# Patient Record
Sex: MALE | Race: BLACK OR AFRICAN AMERICAN | Employment: UNEMPLOYED | URBAN - METROPOLITAN AREA
[De-identification: names, ages, dates, MRNs, and addresses within clinical notes are randomized per-mention and may not be internally consistent; named-entity substitution may affect disease eponyms.]

---

## 2018-10-16 ENCOUNTER — HOSPITAL ENCOUNTER (EMERGENCY)
Age: 43
Discharge: HOME | End: 2018-10-16
Attending: EMERGENCY MEDICINE
Payer: SELF-PAY

## 2018-10-16 ENCOUNTER — APPOINTMENT (OUTPATIENT)
Dept: GENERAL RADIOLOGY | Age: 43
End: 2018-10-16
Attending: PHYSICIAN ASSISTANT
Payer: SELF-PAY

## 2018-10-16 VITALS
HEART RATE: 115 BPM | TEMPERATURE: 98.5 F | WEIGHT: 153.44 LBS | RESPIRATION RATE: 14 BRPM | SYSTOLIC BLOOD PRESSURE: 135 MMHG | HEIGHT: 71 IN | OXYGEN SATURATION: 99 % | DIASTOLIC BLOOD PRESSURE: 89 MMHG | BODY MASS INDEX: 21.48 KG/M2

## 2018-10-16 DIAGNOSIS — M25.561 ACUTE PAIN OF RIGHT KNEE: Primary | ICD-10-CM

## 2018-10-16 PROCEDURE — 73562 X-RAY EXAM OF KNEE 3: CPT

## 2018-10-16 PROCEDURE — 99283 EMERGENCY DEPT VISIT LOW MDM: CPT

## 2018-10-16 PROCEDURE — 74011250637 HC RX REV CODE- 250/637: Performed by: EMERGENCY MEDICINE

## 2018-10-16 RX ORDER — NAPROXEN 500 MG/1
500 TABLET ORAL
Qty: 20 TAB | Refills: 0 | Status: SHIPPED | OUTPATIENT
Start: 2018-10-16

## 2018-10-16 RX ORDER — NAPROXEN 250 MG/1
500 TABLET ORAL
Status: COMPLETED | OUTPATIENT
Start: 2018-10-16 | End: 2018-10-16

## 2018-10-16 RX ADMIN — NAPROXEN 500 MG: 250 TABLET ORAL at 03:08

## 2018-10-16 NOTE — LETTER
[address] 
[facility] EMERGENCY DEPT 
[address] [place] [address] [address] [id number] [phone] Work/School Note Date: 10/16/2018 To Whom It May concern: 
 
Brenden Cheng was seen and treated today in the emergency room by the following provider(s): 
Attending Provider: Nora Montiel MD.   
 
Brenden Cheng may return to work on 10/17/18.  
 
Sincerely, 
 
 
 
 
Nora Montiel MD

## 2018-10-16 NOTE — ED PROVIDER NOTES
EMERGENCY DEPARTMENT HISTORY AND PHYSICAL EXAM 
 
Date: 10/16/2018 Patient Name: Alessandra Thao History of Presenting Illness Chief Complaint Patient presents with  Knee Pain  
  right knee pain since Thursday. no known injury History Provided By: Patient HPI: Alessandra Thao is a 39 y.o. male, who presents ambulatory to the ED c/o persistent, throbbing right knee pain x5 days. Pt hx of previous injuries to the right knee in the past, but denies any new, recent injuries to contribute to the onset. He reports taking Ibuprofen without any relief of the pain. Pt additionally notes he is not followed by Orthopedics for his knee, noting he just recently moved to the area and has not found a PCP. He denies any relieving or exacerbating factors. Pt specifically denies any fever, congestion, cough, shortness of breath, chest pain, abdominal pain, nausea, vomiting, diarrhea, dysuria, or urinary frequency. PCP: None PMHx: Significant for none PSHx: Significant for none Social Hx: +tobacco, -EtOH, +Illicit Drugs (Marijuana) There are no other complaints, changes, or physical findings at this time. Current Outpatient Prescriptions Medication Sig Dispense Refill  naproxen (NAPROSYN) 500 mg tablet Take 1 Tab by mouth every twelve (12) hours as needed for Pain. 20 Tab 0 Past History Past Medical History: No past medical history on file. Past Surgical History: No past surgical history on file. Family History: No family history on file. Social History: 
Social History Substance Use Topics  Smoking status: Not on file  Smokeless tobacco: Not on file  Alcohol use Not on file Allergies: 
No Known Allergies Review of Systems Review of Systems Constitutional: Negative for chills and fever. HENT: Negative. Eyes: Negative. Respiratory: Negative for cough, chest tightness and shortness of breath. Cardiovascular: Negative for chest pain and leg swelling. Gastrointestinal: Negative for abdominal pain, diarrhea, nausea and vomiting. Endocrine: Negative. Genitourinary: Negative for difficulty urinating and dysuria. Musculoskeletal: Positive for myalgias (R knee). Skin: Negative. Neurological: Negative. Psychiatric/Behavioral: Negative. All other systems reviewed and are negative. Physical Exam  
Physical Exam  
Constitutional: He is oriented to person, place, and time. He appears well-developed and well-nourished. No distress. HENT:  
Head: Normocephalic and atraumatic. Nose: Nose normal.  
Mouth/Throat: No oropharyngeal exudate. Eyes: Conjunctivae and EOM are normal. Pupils are equal, round, and reactive to light. Neck: Normal range of motion. Neck supple. No JVD present. Cardiovascular: Normal rate, regular rhythm, normal heart sounds and intact distal pulses. Exam reveals no friction rub. No murmur heard. Pulmonary/Chest: Effort normal and breath sounds normal. No stridor. No respiratory distress. He has no wheezes. He has no rales. Abdominal: Soft. Bowel sounds are normal. He exhibits no distension. There is no tenderness. There is no rebound. Musculoskeletal: Normal range of motion. Right knee: Tenderness found. Legs: 
Neurological: He is alert and oriented to person, place, and time. No cranial nerve deficit. Skin: Skin is warm and dry. No rash noted. He is not diaphoretic. Psychiatric: He has a normal mood and affect. His speech is normal and behavior is normal. Judgment and thought content normal. Cognition and memory are normal.  
Nursing note and vitals reviewed. Diagnostic Study Results Labs - No results found for this or any previous visit (from the past 12 hour(s)). Radiologic Studies -  
XR KNEE RT 3 V Final Result CT Results  (Last 48 hours) None CXR Results  (Last 48 hours) None Medical Decision Making I am the first provider for this patient. I reviewed the vital signs, available nursing notes, past medical history, past surgical history, family history and social history. Vital Signs-Reviewed the patient's vital signs. Patient Vitals for the past 12 hrs: 
 Temp Pulse Resp BP SpO2  
10/16/18 0045 98.5 °F (36.9 °C) (!) 115 14 135/89 99 % Pulse Oximetry Analysis - 99% on RA Cardiac Monitor:  
Rate: 115 bpm 
Rhythm: Sinus Tachycardia Records Reviewed: Nursing Notes and Old Medical Records Provider Notes (Medical Decision Making): DDX: 
Contusion, arthritis Plan: 
Alana Kenney Impression: 
Chronic knee pain ED Course:  
Initial assessment performed. The patients presenting problems have been discussed, and they are in agreement with the care plan formulated and outlined with them. I have encouraged them to ask questions as they arise throughout their visit. I reviewed our electronic medical record system for any past medical records that were available that may contribute to the patients current condition, the nursing notes and and vital signs from today's visit Nursing notes will be reviewed as they become available in realtime while the pt has been in the ED. Paulo Pollard MD 
 
I have spent 3-7 minutes discussing the medical risks of prolonged smoking habits and advised the patient of the benefits of the cessation of smoking, providing specific suggestions on how to quit. Paulo Pollard MD 
 
I personally reviewed pt's imaging. Official read by radiology listed above. Paulo Pollard MD 
 
3:05 AM 
Progress note: 
Pt noted to be ready for discharge. Discussed imaging findings with pt and/or family, specifically noting negative x-ray. Pt will follow up as instructed. All questions have been answered, pt voiced understanding and agreement with plan.  
 
 If narcotics were prescribed, pt was advised not to drive or operate heavy machinery. If abx were prescribed, pt advised that diarrhea and rash are possible side effects of the medications. Specific return precautions provided in addition to instructions for pt to return to the ED immediately should sx worsen at any time. Arlyn Gilmore MD 
 
 
Critical Care Time:  
 
none PLAN: 
1. Discharge Medication List as of 10/16/2018  2:31 AM  
  
START taking these medications Details  
naproxen (NAPROSYN) 500 mg tablet Take 1 Tab by mouth every twelve (12) hours as needed for Pain., Print, Disp-20 Tab, R-0  
  
  
 
2. Follow-up Information Follow up With Details Comments Contact Info [facility] EMERGENCY DEPT  As needed [address] 
[phone] [facility] Schedule an appointment as soon as possible for a visit in 2 days  [address] 
[phone] Return to ED if worse Disposition: 
 
3:05 AM  
The patient's results have been reviewed with family and/or caregiver. They verbally convey their understanding and agreement of the patient's signs, symptoms, diagnosis, treatment and prognosis and additionally agree to follow up as recommended in the discharge instructions or to return to the Emergency Room should the patient's condition change prior to their follow-up appointment. The family and/or caregiver verbally agrees with the care-plan and all of their questions have been answered. The discharge instructions have also been provided to the them with educational information regarding the patient's diagnosis as well a list of reasons why the patient would want to return to the ER prior to their follow-up appointment should their condition change. Arlyn Gilmore MD 
 
 
Diagnosis Clinical Impression: 1. Acute pain of right knee Attestations:  
 
This note is prepared by Addis Ornelas, acting as Scribe for Arlyn Gilmore MD 
 
 Michele Carmichael MD : The scribe's documentation has been prepared under my direction and personally reviewed by me in its entirety. I confirm that the note above accurately reflects all work, treatment, procedures, and medical decision making performed by me. This note will not be viewable in [address]

## 2018-10-16 NOTE — DISCHARGE INSTRUCTIONS
Knee Pain or Injury: Care Instructions  Your Care Instructions    Injuries are a common cause of knee problems. Sudden (acute) injuries may be caused by a direct blow to the knee. They can also be caused by abnormal twisting, bending, or falling on the knee. Pain, bruising, or swelling may be severe, and may start within minutes of the injury. Overuse is another cause of knee pain. Other causes are climbing stairs, kneeling, and other activities that use the knee. Everyday wear and tear, especially as you get older, also can cause knee pain. Rest, along with home treatment, often relieves pain and allows your knee to heal. If you have a serious knee injury, you may need tests and treatment. Follow-up care is a key part of your treatment and safety. Be sure to make and go to all appointments, and call your doctor if you are having problems. It's also a good idea to know your test results and keep a list of the medicines you take. How can you care for yourself at home? · Be safe with medicines. Read and follow all instructions on the label. ¨ If the doctor gave you a prescription medicine for pain, take it as prescribed. ¨ If you are not taking a prescription pain medicine, ask your doctor if you can take an over-the-counter medicine. · Rest and protect your knee. Take a break from any activity that may cause pain. · Put ice or a cold pack on your knee for 10 to 20 minutes at a time. Put a thin cloth between the ice and your skin. · Prop up a sore knee on a pillow when you ice it or anytime you sit or lie down for the next 3 days. Try to keep it above the level of your heart. This will help reduce swelling. · If your knee is not swollen, you can put moist heat, a heating pad, or a warm cloth on your knee. · If your doctor recommends an elastic bandage, sleeve, or other type of support for your knee, wear it as directed.   · Follow your doctor's instructions about how much weight you can put on your leg. Use a cane, crutches, or a walker as instructed. · Follow your doctor's instructions about activity during your healing process. If you can do mild exercise, slowly increase your activity. · Reach and stay at a healthy weight. Extra weight can strain the joints, especially the knees and hips, and make the pain worse. Losing even a few pounds may help. When should you call for help? Call 911 anytime you think you may need emergency care. For example, call if:    · You have symptoms of a blood clot in your lung (called a pulmonary embolism). These may include:  ¨ Sudden chest pain. ¨ Trouble breathing. ¨ Coughing up blood.    Call your doctor now or seek immediate medical care if:    · You have severe or increasing pain.     · Your leg or foot turns cold or changes color.     · You cannot stand or put weight on your knee.     · Your knee looks twisted or bent out of shape.     · You cannot move your knee.     · You have signs of infection, such as:  ¨ Increased pain, swelling, warmth, or redness. ¨ Red streaks leading from the knee. ¨ Pus draining from a place on your knee. ¨ A fever.     · You have signs of a blood clot in your leg (called a deep vein thrombosis), such as:  ¨ Pain in your calf, back of the knee, thigh, or groin. ¨ Redness and swelling in your leg or groin.    Watch closely for changes in your health, and be sure to contact your doctor if:    · You have tingling, weakness, or numbness in your knee.     · You have any new symptoms, such as swelling.     · You have bruises from a knee injury that last longer than 2 weeks.     · You do not get better as expected. Where can you learn more? Go to http://kamari-norman.info/. Enter K195 in the search box to learn more about \"Knee Pain or Injury: Care Instructions. \"  Current as of: November 20, 2017  Content Version: 11.8  © 0808-3127 [employer], [employer].  Care instructions adapted under license by [employer] (which disclaims liability or warranty for this information). If you have questions about a medical condition or this instruction, always ask your healthcare professional. [address] any warranty or liability for your use of this information.